# Patient Record
Sex: FEMALE | Race: WHITE | NOT HISPANIC OR LATINO | Employment: FULL TIME | URBAN - METROPOLITAN AREA
[De-identification: names, ages, dates, MRNs, and addresses within clinical notes are randomized per-mention and may not be internally consistent; named-entity substitution may affect disease eponyms.]

---

## 2023-08-14 ASSESSMENT — PROMIS GLOBAL HEALTH SCALE
RATE_QUALITY_OF_LIFE: EXCELLENT
EMOTIONAL_PROBLEMS: NEVER
CARRYOUT_PHYSICAL_ACTIVITIES: COMPLETELY
CARRYOUT_SOCIAL_ACTIVITIES: EXCELLENT
RATE_SOCIAL_SATISFACTION: EXCELLENT
RATE_AVERAGE_PAIN: 1
RATE_AVERAGE_FATIGUE: MILD
RATE_PHYSICAL_HEALTH: VERY GOOD
RATE_MENTAL_HEALTH: EXCELLENT
RATE_GENERAL_HEALTH: VERY GOOD

## 2023-08-18 ENCOUNTER — OFFICE VISIT (OUTPATIENT)
Dept: PRIMARY CARE | Facility: CLINIC | Age: 58
End: 2023-08-18
Payer: COMMERCIAL

## 2023-08-18 VITALS
SYSTOLIC BLOOD PRESSURE: 124 MMHG | DIASTOLIC BLOOD PRESSURE: 76 MMHG | OXYGEN SATURATION: 97 % | HEART RATE: 58 BPM | WEIGHT: 177 LBS | BODY MASS INDEX: 25.4 KG/M2

## 2023-08-18 DIAGNOSIS — Z00.00 PREVENTATIVE HEALTH CARE: Primary | ICD-10-CM

## 2023-08-18 DIAGNOSIS — R79.89 LOW VITAMIN D LEVEL: ICD-10-CM

## 2023-08-18 PROBLEM — D25.9 FIBROID UTERUS: Status: ACTIVE | Noted: 2023-08-18

## 2023-08-18 PROBLEM — I42.9 CARDIOMYOPATHY (MULTI): Status: ACTIVE | Noted: 2023-08-18

## 2023-08-18 PROBLEM — K75.81 NASH (NONALCOHOLIC STEATOHEPATITIS): Status: ACTIVE | Noted: 2023-08-18

## 2023-08-18 PROBLEM — I10 HYPERTENSION: Status: ACTIVE | Noted: 2023-08-18

## 2023-08-18 PROBLEM — M47.812 CERVICAL SPONDYLOSIS WITHOUT MYELOPATHY: Status: ACTIVE | Noted: 2023-08-18

## 2023-08-18 PROBLEM — L65.9 ALOPECIA: Status: ACTIVE | Noted: 2023-08-18

## 2023-08-18 PROBLEM — I05.9 MITRAL VALVE DISORDER: Status: ACTIVE | Noted: 2023-08-18

## 2023-08-18 PROBLEM — E06.3 AUTOIMMUNE THYROIDITIS: Status: ACTIVE | Noted: 2023-08-18

## 2023-08-18 PROCEDURE — 3078F DIAST BP <80 MM HG: CPT | Performed by: FAMILY MEDICINE

## 2023-08-18 PROCEDURE — 1036F TOBACCO NON-USER: CPT | Performed by: FAMILY MEDICINE

## 2023-08-18 PROCEDURE — 99213 OFFICE O/P EST LOW 20 MIN: CPT | Performed by: FAMILY MEDICINE

## 2023-08-18 PROCEDURE — 3074F SYST BP LT 130 MM HG: CPT | Performed by: FAMILY MEDICINE

## 2023-08-18 PROCEDURE — 99396 PREV VISIT EST AGE 40-64: CPT | Performed by: FAMILY MEDICINE

## 2023-08-18 RX ORDER — LOSARTAN POTASSIUM 50 MG/1
TABLET ORAL EVERY 24 HOURS
COMMUNITY

## 2023-08-18 RX ORDER — FENOFIBRATE 160 MG/1
160 TABLET ORAL DAILY
COMMUNITY

## 2023-08-18 RX ORDER — CARVEDILOL 6.25 MG/1
TABLET ORAL DAILY
COMMUNITY

## 2023-08-18 RX ORDER — FLUTICASONE PROPIONATE 50 MCG
2 SPRAY, SUSPENSION (ML) NASAL DAILY
COMMUNITY
Start: 2015-10-14

## 2023-08-18 RX ORDER — ALIROCUMAB 150 MG/ML
INJECTION, SOLUTION SUBCUTANEOUS
COMMUNITY

## 2023-08-18 RX ORDER — ESTRADIOL 0.1 MG/G
CREAM VAGINAL
COMMUNITY
Start: 2018-04-30

## 2023-08-18 RX ORDER — LEVOTHYROXINE SODIUM 100 UG/1
100 TABLET ORAL
COMMUNITY
End: 2023-08-28 | Stop reason: SDUPTHER

## 2023-08-18 ASSESSMENT — PATIENT HEALTH QUESTIONNAIRE - PHQ9
1. LITTLE INTEREST OR PLEASURE IN DOING THINGS: NOT AT ALL
2. FEELING DOWN, DEPRESSED OR HOPELESS: NOT AT ALL
SUM OF ALL RESPONSES TO PHQ9 QUESTIONS 1 AND 2: 0

## 2023-08-18 NOTE — PROGRESS NOTES
Subjective   Patient is a 58 y.o. female presents for yearly physical examination.     neck strain  referred from Dr Holt.   Lipid palacio in --> 176 weight      #) hair loss- comes and goes      #) CHF-->   - at 49 yrs old  - acute shortness of breath   - was told she had pneumonia   - referred to cardiologist - yearly   - EF 15%- treated with carvedilol  - cardio rehab   --- EF now at 55%   - lisinopril gave her a cough -- now on losartan   - father  at 41 of CHF      #) hormone imbalance   - hirsutism      #) [facility]  - had scan   - 63% fat  - follows with GI - needs a follow up   - milk thisle and vit E, tumeric dandelion   - on feno and Praulent      #) upper neck pain - more on the right side   pain started in 2020  - 3/10--> 2/10-- has been ok, flare up from time to time   tried neck massage and stretching   did PT with neck traction and massage   trigger point injections 21- not much help, slight a couple days later  - is better now   muscle spasm   dull achy pain      2018- EGD and colonoscopy - due every 5   PAP May 2021   Mammo 2021- new order given     Review of system are negative unless otherwise stated in the HPI.     Objective     /76   Pulse 58   Wt 80.3 kg (177 lb)   SpO2 97%   BMI 25.40 kg/m²     Physical Examination  GEN: A+O, no acute distress  HEENT: NC/AT, Oropharynx clear, no exudates, TM visualized, Extraoccular muscles intact, no facial droop; no thyromegaly or cervical LAD  RESP: CTAB, no wheezes   CV: RRR, no murmurs  ABD: soft, non-tender, + BS  SKIN: no rashes or bruising, no peripheral edema   NEURO: CN II-XII grossly intact, moves all extremities equally, no tremor   PSYCH: normal affect, appropriate mood     Assessment/Plan     please get updated fasting blood work    Order for a yearly mammogram.      Preventative cancer screens SAVE LIVES!  - mammograms are recommend yearly starting at the age of 40 in women, sometimes sooner based on family  history. ORDER GIVEN   - Colon cancer screening is recommended at age 50 for all patients, sometimes sooner based on family history. Please follow up with Dr Huffman  - Cervical cancer screening through the use of a PAP test, is done on all women aged 21-65. FOLLOW UP WITH DR PERSAUD     150 mins of aerobic exercise is advised for good cardiovascular health and maintain a healthy weight.      Please try to work on maintaining a healthy body weight, with a BMI close to or at a BMI 19-25.  Keep up the great work with the weight loss and regular exercises.      Recommend a whole-foods, plant-based diet, filled with fresh fruits, vegetables, seeds, beans, nuts and berries.     Discussed smoking cessation/Abstinence is best.       Abstaining from the use of alcohol is best. However if you choose to drink current guidelines are 2 or less drinks per day for men and 1.5 or less per day for women (and not all saved for one night on the weekend).     Your blood pressure should be BELOW 135/85. Your BP today is 124/76.      OF course, do not text and drive and always wear a seat belt.      call if you need refills.      Follow up in 1 year or sooner as need for acute illness or concern.

## 2023-08-18 NOTE — PATIENT INSTRUCTIONS
please get updated fasting blood work    Order for a yearly mammogram.      Preventative cancer screens S[facility] LIVES!  - mammograms are recommend yearly starting at the age of 40 in women, sometimes sooner based on family history. ORDER GIVEN   - Colon cancer screening is recommended at age 50 for all patients, sometimes sooner based on family history. Please follow up with Dr Huffman  - Cervical cancer screening through the use of a PAP test, is done on all women aged 21-65. FOLLOW UP WITH DR PERSAUD     150 mins of aerobic exercise is advised for good cardiovascular health and maintain a healthy weight.      Please try to work on maintaining a healthy body weight, with a BMI close to or at a BMI 19-25.  Keep up the great work with the weight loss and regular exercises.      Recommend a whole-foods, plant-based diet, filled with fresh fruits, vegetables, seeds, beans, nuts and berries.     Discussed smoking cessation/Abstinence is best.       Abstaining from the use of alcohol is best. However if you choose to drink current guidelines are 2 or less drinks per day for men and 1.5 or less per day for women (and not all saved for one night on the weekend).     Your blood pressure should be BELOW 135/85. Your BP today is 124/76.      OF course, do not text and drive and always wear a seat belt.      call if you need refills.      Follow up in 1 year or sooner as need for acute illness or concern.

## 2023-08-28 DIAGNOSIS — L65.9 ALOPECIA: ICD-10-CM

## 2023-08-29 RX ORDER — LEVOTHYROXINE SODIUM 100 UG/1
100 TABLET ORAL
Qty: 90 TABLET | Refills: 3 | Status: SHIPPED | OUTPATIENT
Start: 2023-08-29 | End: 2023-08-31 | Stop reason: CLARIF

## 2023-08-31 DIAGNOSIS — E06.3 AUTOIMMUNE THYROIDITIS: ICD-10-CM

## 2023-08-31 RX ORDER — LEVOTHYROXINE SODIUM 100 UG/1
100 TABLET ORAL
Qty: 90 TABLET | Refills: 3 | Status: SHIPPED | OUTPATIENT
Start: 2023-08-31

## 2023-08-31 RX ORDER — LEVOTHYROXINE SODIUM 100 UG/1
100 TABLET ORAL
COMMUNITY
End: 2023-08-31 | Stop reason: SDUPTHER

## 2024-07-12 NOTE — PROGRESS NOTES
Subjective   Patient ID: Isamar Mccoy is a 59 y.o. female who presents for No chief complaint on file..    PAP - 03/10/17 NEGATIVE, HPP NEGATIVE always normal  MAMMO - 21 NEG  DEXA - NEVER  COLO - 2019 NEG PER PT negative.     - VAGINAL DRYNESS FOR YEARS. ESTRADIOL CREAM IS HELPING, SHE WOULD LIKE A REFILL TODAY. Hot flashes always. One cup of coffee in am. Occasional ETOH.     ,  x2. Works in an office. ****Diagnosed with CHF . OK now. Vaginal itching for 5 years. uses estrogen cream. No sex 2 years. Did pelvic floor therapy. Saw urologist. Got some help with new PT. No discharge.     no VB. No period 5 years. H/O fibroids, ovarian cysts. U/S in chart, 3 small fibroids.      pGM breast ca, only cancer.       Review of Systems    Objective       Assessment/Plan

## 2024-07-15 ENCOUNTER — APPOINTMENT (OUTPATIENT)
Dept: OBSTETRICS AND GYNECOLOGY | Facility: CLINIC | Age: 59
End: 2024-07-15
Payer: COMMERCIAL

## 2024-07-15 VITALS — SYSTOLIC BLOOD PRESSURE: 142 MMHG | WEIGHT: 185.4 LBS | BODY MASS INDEX: 26.6 KG/M2 | DIASTOLIC BLOOD PRESSURE: 82 MMHG

## 2024-07-15 DIAGNOSIS — N64.4 BREAST TENDERNESS: Primary | ICD-10-CM

## 2024-07-15 DIAGNOSIS — Z12.31 VISIT FOR SCREENING MAMMOGRAM: ICD-10-CM

## 2024-07-15 PROBLEM — E06.3 AUTOIMMUNE THYROIDITIS: Status: RESOLVED | Noted: 2023-08-18 | Resolved: 2024-07-15

## 2024-07-15 PROBLEM — I05.9 MITRAL VALVE DISORDER: Status: RESOLVED | Noted: 2023-08-18 | Resolved: 2024-07-15

## 2024-07-15 PROCEDURE — 99213 OFFICE O/P EST LOW 20 MIN: CPT | Performed by: OBSTETRICS & GYNECOLOGY

## 2024-07-15 PROCEDURE — 3077F SYST BP >= 140 MM HG: CPT | Performed by: OBSTETRICS & GYNECOLOGY

## 2024-07-15 PROCEDURE — 1036F TOBACCO NON-USER: CPT | Performed by: OBSTETRICS & GYNECOLOGY

## 2024-07-15 PROCEDURE — 3079F DIAST BP 80-89 MM HG: CPT | Performed by: OBSTETRICS & GYNECOLOGY

## 2024-07-15 ASSESSMENT — ENCOUNTER SYMPTOMS
RESPIRATORY NEGATIVE: 1
PSYCHIATRIC NEGATIVE: 1
GASTROINTESTINAL NEGATIVE: 1
CARDIOVASCULAR NEGATIVE: 1
MUSCULOSKELETAL NEGATIVE: 1
NEUROLOGICAL NEGATIVE: 1
CONSTITUTIONAL NEGATIVE: 1

## 2024-07-15 NOTE — PROGRESS NOTES
Subjective   Patient ID: Isamar Mccoy is a 59 y.o. female who presents for Breast Problem.  Breast Problem      Patient presents for breast tenderness. She states she first notice left breast tenderness/heaviness 2 weeks ago. She states the discomfort persisted and is now improving. About a week ago she noted a small white nodule on the nipple that has not changed in the last week. She denies puckering/dimpling/skin changes or nipple drainage. Her right breast is asymptomatic.    Review of Systems   Constitutional: Negative.    Respiratory: Negative.     Cardiovascular: Negative.    Gastrointestinal: Negative.    Genitourinary: Negative.    Musculoskeletal: Negative.    Skin: Negative.    Neurological: Negative.    Psychiatric/Behavioral: Negative.         Objective   Visit Vitals  /82   Wt 84.1 kg (185 lb 6.4 oz)   BMI 26.60 kg/m²   OB Status Postmenopausal   Smoking Status Never   BSA 2.04 m²       Physical Exam  Constitutional:       Appearance: Normal appearance.   Pulmonary:      Effort: Pulmonary effort is normal.   Chest:   Breasts:     Right: Absent.      Left: Absent.   Musculoskeletal:      Cervical back: Normal range of motion and neck supple.   Lymphadenopathy:      Upper Body:      Right upper body: No supraclavicular, axillary or pectoral adenopathy.      Left upper body: No supraclavicular, axillary or pectoral adenopathy.   Neurological:      Mental Status: She is alert.         Assessment/Plan   Problem List Items Addressed This Visit    None  Visit Diagnoses         Codes    Breast tenderness    -  Primary N64.4    Visit for screening mammogram     Z12.31    Relevant Orders    BI mammo bilateral screening tomosynthesis        Discussed findings on exam and clinical course  White nodule appears to be benign, likely either white head or sebaceous cyst.   No palpable abnormalities on exam today  Patient is due for a screening mammogram, ordered today  Precautions given         Melly NEGRO  DO Daniel 07/15/24 4:06 PM

## 2024-07-15 NOTE — PATIENT INSTRUCTIONS
Gynecologic Visit for Breast Pain:  You were seen today in office for pain in your breast  Breast pain may be due to cysts, dense tissue, lactating, fibrocystic breast disease, nerve/muscle pain, and benign or malignant tumors  A mammogram was indicated and ordered today  You can use Tylenol, warm/cool compresses, and Evening Primrose Oil. Avoid/cut back on caffeine and alcohol.  If you have any issues or symptoms are not improving, please call the office at [phone] ([place]) or [phone] [place]

## 2024-07-18 ENCOUNTER — HOSPITAL ENCOUNTER (OUTPATIENT)
Dept: RADIOLOGY | Facility: HOSPITAL | Age: 59
Discharge: HOME | End: 2024-07-18
Payer: COMMERCIAL

## 2024-07-18 DIAGNOSIS — Z12.31 VISIT FOR SCREENING MAMMOGRAM: ICD-10-CM

## 2024-07-18 PROCEDURE — 77063 BREAST TOMOSYNTHESIS BI: CPT | Performed by: RADIOLOGY

## 2024-07-18 PROCEDURE — 77067 SCR MAMMO BI INCL CAD: CPT | Performed by: RADIOLOGY

## 2024-07-18 PROCEDURE — 77063 BREAST TOMOSYNTHESIS BI: CPT

## 2024-08-17 ASSESSMENT — PROMIS GLOBAL HEALTH SCALE
CARRYOUT_SOCIAL_ACTIVITIES: EXCELLENT
RATE_SOCIAL_SATISFACTION: VERY GOOD
CARRYOUT_PHYSICAL_ACTIVITIES: COMPLETELY
RATE_AVERAGE_PAIN: 2
RATE_PHYSICAL_HEALTH: VERY GOOD
RATE_MENTAL_HEALTH: VERY GOOD
EMOTIONAL_PROBLEMS: NEVER
RATE_AVERAGE_FATIGUE: MILD
RATE_QUALITY_OF_LIFE: VERY GOOD
RATE_GENERAL_HEALTH: VERY GOOD

## 2024-08-18 NOTE — PROGRESS NOTES
"Subjective   Patient ID: Isamar Mccoy is a 59 y.o. female who presents for Annual Exam.    PAP - 21 NEG NEG; 03/10/17 NEGATIVE, HPP NEGATIVE always normal  MAMMO - 24  DEXA - NEVER  COLO - 23 negative, 5 years.     Seen for breast tenderness. Negative mammogram. White cyst drained.  Last annual .    Uses \"V magic\" for vaginal dryness, not using estradiol cream. Hot flashes always. One cup of coffee in am. Occasional ETOH.     ,  x2. Works in an office. **Diagnosed with CHF . OK now. Vaginal itching for 5 years. uses estrogen cream. No sex 2 years. Did pelvic floor therapy. Saw urologist. Got some help with new PT. No discharge.     no VB. H/O fibroids, ovarian cysts. U/S in chart, 3 small fibroids.      pGM breast ca, only cancer.       Review of Systems   Genitourinary:         Hot flashes.   All other systems reviewed and are negative.      Objective   Constitutional: Alert and in no acute distress. Well developed, well nourished.  Neck: no neck asymmetry. Supple and thyroid not enlarged and there were no palpable thyroid nodules.  Chest: Breasts normal appearance, no nipple discharge and no skin changes and palpation of breasts and axillae: no palpable mass and no axillary lymphadenopathy.  Abdomen: soft nontender; no abdominal mass palpated, no organomegaly and no hernias.  Genitourinary: external genitalia: normal, no inguinal lymphadenopathy, Bartholin's urethral and Framingham's glands: normal, urethra: normal and bladder: normal on palpation.  Vagina: normal.  Cervix: normal.  Uterus: normal.   Right adnexa/parametria: normal.  Left adnexa/parametria: normal.  Skin: normal skin color and pigmentation, normal skin turgor and no rash.  Psychiatric: alert and oriented x 3, affect normal to patient baseline and mood appropriate.     Assessment/Plan   -Pap-HPV, per patient request. Understands recommendations.  -tony-ervin for next year.  -Wants to try Estradiol again for " dyspareunia.

## 2024-08-19 ENCOUNTER — APPOINTMENT (OUTPATIENT)
Dept: OBSTETRICS AND GYNECOLOGY | Facility: CLINIC | Age: 59
End: 2024-08-19
Payer: COMMERCIAL

## 2024-08-19 VITALS
HEIGHT: 70 IN | WEIGHT: 183 LBS | SYSTOLIC BLOOD PRESSURE: 112 MMHG | BODY MASS INDEX: 26.2 KG/M2 | DIASTOLIC BLOOD PRESSURE: 70 MMHG

## 2024-08-19 DIAGNOSIS — Z12.4 CERVICAL CANCER SCREENING: ICD-10-CM

## 2024-08-19 DIAGNOSIS — Z01.419 WELL WOMAN EXAM WITH ROUTINE GYNECOLOGICAL EXAM: Primary | ICD-10-CM

## 2024-08-19 DIAGNOSIS — N94.10 DYSPAREUNIA IN FEMALE: ICD-10-CM

## 2024-08-19 DIAGNOSIS — Z12.31 ENCOUNTER FOR SCREENING MAMMOGRAM FOR MALIGNANT NEOPLASM OF BREAST: ICD-10-CM

## 2024-08-19 PROCEDURE — 3074F SYST BP LT 130 MM HG: CPT | Performed by: OBSTETRICS & GYNECOLOGY

## 2024-08-19 PROCEDURE — 87624 HPV HI-RISK TYP POOLED RSLT: CPT

## 2024-08-19 PROCEDURE — 99396 PREV VISIT EST AGE 40-64: CPT | Performed by: OBSTETRICS & GYNECOLOGY

## 2024-08-19 PROCEDURE — 88142 CYTOPATH C/V THIN LAYER: CPT

## 2024-08-19 PROCEDURE — 3008F BODY MASS INDEX DOCD: CPT | Performed by: OBSTETRICS & GYNECOLOGY

## 2024-08-19 PROCEDURE — 3078F DIAST BP <80 MM HG: CPT | Performed by: OBSTETRICS & GYNECOLOGY

## 2024-08-19 PROCEDURE — 1036F TOBACCO NON-USER: CPT | Performed by: OBSTETRICS & GYNECOLOGY

## 2024-08-19 RX ORDER — ESTRADIOL 0.1 MG/G
0.5 CREAM VAGINAL 2 TIMES WEEKLY
Qty: 42.5 G | Refills: 3 | Status: SHIPPED | OUTPATIENT
Start: 2024-08-19

## 2024-08-23 ENCOUNTER — APPOINTMENT (OUTPATIENT)
Dept: PRIMARY CARE | Facility: CLINIC | Age: 59
End: 2024-08-23
Payer: COMMERCIAL

## 2024-08-23 VITALS
HEART RATE: 62 BPM | BODY MASS INDEX: 26.4 KG/M2 | WEIGHT: 184 LBS | OXYGEN SATURATION: 98 % | SYSTOLIC BLOOD PRESSURE: 122 MMHG | DIASTOLIC BLOOD PRESSURE: 80 MMHG

## 2024-08-23 DIAGNOSIS — I10 PRIMARY HYPERTENSION: ICD-10-CM

## 2024-08-23 DIAGNOSIS — B33.24 VIRAL CARDIOMYOPATHY (MULTI): ICD-10-CM

## 2024-08-23 DIAGNOSIS — E55.9 AVITAMINOSIS D: ICD-10-CM

## 2024-08-23 DIAGNOSIS — Z00.00 ROUTINE GENERAL MEDICAL EXAMINATION AT A HEALTH CARE FACILITY: Primary | ICD-10-CM

## 2024-08-23 DIAGNOSIS — E89.0 POSTOPERATIVE HYPOTHYROIDISM: ICD-10-CM

## 2024-08-23 DIAGNOSIS — K75.81 NASH (NONALCOHOLIC STEATOHEPATITIS): ICD-10-CM

## 2024-08-23 PROCEDURE — 3074F SYST BP LT 130 MM HG: CPT | Performed by: FAMILY MEDICINE

## 2024-08-23 PROCEDURE — 99396 PREV VISIT EST AGE 40-64: CPT | Performed by: FAMILY MEDICINE

## 2024-08-23 PROCEDURE — 3079F DIAST BP 80-89 MM HG: CPT | Performed by: FAMILY MEDICINE

## 2024-08-23 PROCEDURE — 1036F TOBACCO NON-USER: CPT | Performed by: FAMILY MEDICINE

## 2024-08-23 PROCEDURE — 99213 OFFICE O/P EST LOW 20 MIN: CPT | Performed by: FAMILY MEDICINE

## 2024-08-23 RX ORDER — CLOBETASOL PROPIONATE 0.5 MG/G
CREAM TOPICAL
COMMUNITY
Start: 2024-08-15

## 2024-08-23 ASSESSMENT — PATIENT HEALTH QUESTIONNAIRE - PHQ9
1. LITTLE INTEREST OR PLEASURE IN DOING THINGS: NOT AT ALL
SUM OF ALL RESPONSES TO PHQ9 QUESTIONS 1 AND 2: 0
2. FEELING DOWN, DEPRESSED OR HOPELESS: NOT AT ALL

## 2024-08-23 NOTE — PATIENT INSTRUCTIONS
please get updated fasting blood work    Order for a yearly mammogram is in from Dr Dixon.  Pending PAP results     Colonscopy was good on 11/21/23, repeat in 5 years      150 mins of aerobic exercise is advised for good cardiovascular health and maintain a healthy weight.      Please try to work on maintaining a healthy body weight, with a BMI close to or at a BMI 19-25. Your BMI is 26 today   Keep up the great work with the weight loss and regular exercises.      Recommend a whole-foods, plant-based diet, filled with fresh fruits, vegetables, seeds, beans, nuts and berries.     Discussed smoking cessation/Abstinence is best.       Abstaining from the use of alcohol is best. However if you choose to drink current guidelines are 2 or less drinks per day for men and 1.5 or less per day for women (and not all saved for one night on the weekend).     Your blood pressure should be BELOW 135/85. Your BP today is 122/80     Continue the follow up with Dr tompkins and Dr Katz as recommended     OF course, do not text and drive and always wear a seat belt.      call if you need refills.      Follow up in 1 year or sooner as need for acute illness or concern.

## 2024-08-23 NOTE — PROGRESS NOTES
Subjective   Patient is a 59 y.o. female presents for yearly physical examination.     #) EGD and colonoscopy was good on 23  - repeat Cscope every 5 years      #) hair loss- comes and goes     #) left foot plantar fasciitis   - changes shoes   - has a stretching brace   - has been seeing a podiatrist, Dr Charleen Harper    #) contact dermatitis   - started 2024   - is seeing a dermatologist   - started on a steroid cream last week  - originally dx with shingles and was treated with valtrex      #) CHF-->   - at 49 yrs old  - acute shortness of breath , better, no CP   - did stop the praluent abot 1 year ago, due to abdominal pain and the belly symptoms improved  - referred to cardiologist - yearly with Dr Dutton   - EF 15%- treated with carvedilol  - cardio rehab   --- EF now at 55%   - lisinopril gave her a cough -- now on losartan   - father  at 41 of CHF      #) hormone imbalance   - hirsutism      #) [facility]  - had scan   - 63% fat  - follows with GI - needs a follow up   - milk thisle and vit E, tumeric dandelion   - on feno and Praulent -- which she stopped      #) upper neck pain - more on the right side - good if not doing overhead exercises   pain started in 2020  - 3/10--> 2/10-- has been ok, flare up from time to time   tried neck massage and stretching   did PT with neck traction and massage   trigger point injections 21- not much help, slight a couple days later  - is better now   muscle spasm   dull achy pain      2018- EGD and colonoscopy - due every 5   PAP May 2021   Mammo 2021- new order given     Review of system are negative unless otherwise stated in the HPI.     Objective     /80   Pulse 62   Wt 83.5 kg (184 lb)   SpO2 98%   BMI 26.40 kg/m²     Physical Examination  GEN: A+O, no acute distress  HEENT: NC/AT, Oropharynx clear, no exudates, TM visualized, Extraoccular muscles intact, no facial droop; no thyromegaly or cervical LAD  RESP: CTAB, no  wheezes   CV: RRR, no murmurs  ABD: soft, non-tender, + BS  SKIN: no rashes or bruising, no peripheral edema   NEURO: CN II-XII grossly intact, moves all extremities equally, no tremor   PSYCH: normal affect, appropriate mood     Assessment/Plan     please get updated fasting blood work    Order for a yearly mammogram is in from Dr Dixon.  Pending PAP results     Colonscopy was good on 11/21/23, repeat in 5 years      150 mins of aerobic exercise is advised for good cardiovascular health and maintain a healthy weight.      Please try to work on maintaining a healthy body weight, with a BMI close to or at a BMI 19-25. Your BMI is 26 today   Keep up the great work with the weight loss and regular exercises.      Recommend a whole-foods, plant-based diet, filled with fresh fruits, vegetables, seeds, beans, nuts and berries.     Discussed smoking cessation/Abstinence is best.       Abstaining from the use of alcohol is best. However if you choose to drink current guidelines are 2 or less drinks per day for men and 1.5 or less per day for women (and not all saved for one night on the weekend).     Your blood pressure should be BELOW 135/85. Your BP today is 122/80     Continue the follow up with Dr tompkins and Dr Katz as recommended     OF course, do not text and drive and always wear a seat belt.      call if you need refills.      Follow up in 1 year or sooner as need for acute illness or concern.

## 2024-08-28 LAB
CYTOLOGY CMNT CVX/VAG CYTO-IMP: NORMAL
HPV HR 12 DNA GENITAL QL NAA+PROBE: NEGATIVE
HPV HR GENOTYPES PNL CVX NAA+PROBE: NEGATIVE
HPV16 DNA SPEC QL NAA+PROBE: NEGATIVE
HPV18 DNA SPEC QL NAA+PROBE: NEGATIVE
LAB AP HPV GENOTYPE QUESTION: YES
LAB AP HPV HR: NORMAL
LABORATORY COMMENT REPORT: NORMAL
LABORATORY COMMENT REPORT: NORMAL
MENSTRUAL HX REPORTED: NORMAL
PATH REPORT.TOTAL CANCER: NORMAL

## 2024-08-29 DIAGNOSIS — E06.3 AUTOIMMUNE THYROIDITIS: ICD-10-CM

## 2024-08-29 DIAGNOSIS — E78.2 MIXED HYPERLIPIDEMIA: ICD-10-CM

## 2024-08-29 DIAGNOSIS — T46.6X5D ADVERSE EFFECT OF ANTIHYPERLIPIDEMIC DRUG, SUBSEQUENT ENCOUNTER: ICD-10-CM

## 2024-08-29 DIAGNOSIS — E89.0 POSTOPERATIVE HYPOTHYROIDISM: Primary | ICD-10-CM

## 2024-08-29 DIAGNOSIS — I42.9 CARDIOMYOPATHY, UNSPECIFIED TYPE (MULTI): ICD-10-CM

## 2024-08-29 RX ORDER — LEVOTHYROXINE SODIUM 112 UG/1
112 TABLET ORAL DAILY
Qty: 90 TABLET | Refills: 0 | Status: SHIPPED | OUTPATIENT
Start: 2024-08-29 | End: 2025-08-29

## 2024-12-17 DIAGNOSIS — E06.3 AUTOIMMUNE THYROIDITIS: ICD-10-CM

## 2024-12-17 DIAGNOSIS — E89.0 POSTOPERATIVE HYPOTHYROIDISM: ICD-10-CM

## 2024-12-20 RX ORDER — LEVOTHYROXINE SODIUM 112 UG/1
112 TABLET ORAL DAILY
Qty: 90 TABLET | Refills: 3 | Status: SHIPPED | OUTPATIENT
Start: 2024-12-20 | End: 2025-12-20

## 2025-05-06 DIAGNOSIS — K64.9 HEMORRHOIDS, UNSPECIFIED HEMORRHOID TYPE: Primary | ICD-10-CM

## 2025-05-06 RX ORDER — HYDROCORTISONE ACETATE SUPPOSITORY 30 MG/1
SUPPOSITORY RECTAL
Qty: 28 SUPPOSITORY | Refills: 0 | Status: SHIPPED | OUTPATIENT
Start: 2025-05-06

## 2025-05-06 RX ORDER — HYDROCORTISONE ACETATE SUPPOSITORY 30 MG/1
SUPPOSITORY RECTAL
Qty: 28 SUPPOSITORY | Refills: 0 | Status: SHIPPED | OUTPATIENT
Start: 2025-05-06 | End: 2025-05-06

## 2025-07-25 ENCOUNTER — APPOINTMENT (OUTPATIENT)
Dept: RADIOLOGY | Facility: HOSPITAL | Age: 60
End: 2025-07-25
Payer: COMMERCIAL